# Patient Record
Sex: MALE
[De-identification: names, ages, dates, MRNs, and addresses within clinical notes are randomized per-mention and may not be internally consistent; named-entity substitution may affect disease eponyms.]

---

## 2019-01-22 ENCOUNTER — HOSPITAL ENCOUNTER (EMERGENCY)
Dept: HOSPITAL 90 - EDH | Age: 64
Discharge: HOME | End: 2019-01-22
Payer: MEDICAID

## 2019-01-22 DIAGNOSIS — E11.9: ICD-10-CM

## 2019-01-22 DIAGNOSIS — Z98.890: ICD-10-CM

## 2019-01-22 DIAGNOSIS — I10: ICD-10-CM

## 2019-01-22 DIAGNOSIS — J11.1: Primary | ICD-10-CM

## 2019-01-22 LAB
ALBUMIN SERPL-MCNC: 3.1 G/DL (ref 3.5–5)
ALT SERPL-CCNC: 16 U/L (ref 12–78)
APTT PPP: 31.4 SEC (ref 26.3–35.5)
AST SERPL-CCNC: 15 U/L (ref 10–37)
BASOPHILS NFR BLD AUTO: 0.5 % (ref 0–5)
BILIRUB SERPL-MCNC: 0.9 MG/DL (ref 0.2–1)
BUN SERPL-MCNC: 21 MG/DL (ref 7–18)
CHLORIDE SERPL-SCNC: 99 MMOL/L (ref 101–111)
CK SERPL-CCNC: 83 U/L (ref 21–232)
CO2 SERPL-SCNC: 27 MMOL/L (ref 21–32)
CREAT SERPL-MCNC: 1.4 MG/DL (ref 0.5–1.5)
EOSINOPHIL NFR BLD AUTO: 0.4 % (ref 0–8)
ERYTHROCYTE [DISTWIDTH] IN BLOOD BY AUTOMATED COUNT: 13.6 % (ref 11–15.5)
GFR SERPL CREATININE-BSD FRML MDRD: 54 ML/MIN (ref 60–?)
GLUCOSE SERPL-MCNC: 287 MG/DL (ref 70–105)
GLUCOSE UR STRIP-MCNC: 500 MG/DL
HCT VFR BLD AUTO: 41.9 % (ref 42–54)
HYALINE CASTS URNS QL MICRO: (no result) /LPF
INR PPP: 1.01 (ref 0.85–1.15)
KETONES UR STRIP-MCNC: 15 MG/DL
LYMPHOCYTES NFR SPEC AUTO: 11.5 % (ref 21–51)
MCH RBC QN AUTO: 26.7 PG (ref 27–33)
MCHC RBC AUTO-ENTMCNC: 32 G/DL (ref 32–36)
MCV RBC AUTO: 83.6 FL (ref 79–99)
MONOCYTES NFR BLD AUTO: 10.6 % (ref 3–13)
MYOGLOBIN SERPL-MCNC: 78 NG/ML (ref 10–92)
NEUTROPHILS NFR BLD AUTO: 77 % (ref 40–77)
NRBC BLD MANUAL-RTO: 0 % (ref 0–0.19)
PH UR STRIP: 5.5 [PH] (ref 5–8)
PLATELET # BLD AUTO: 207 K/UL (ref 130–400)
POTASSIUM SERPL-SCNC: 3.3 MMOL/L (ref 3.5–5.1)
PROT SERPL-MCNC: 7.7 G/DL (ref 6–8.3)
PROT UR QL STRIP: 300
PROTHROMBIN TIME: 10.6 SEC (ref 9.6–11.6)
RBC # BLD AUTO: 5.01 MIL/UL (ref 4.5–6.2)
SODIUM SERPL-SCNC: 136 MMOL/L (ref 136–145)
SP GR UR STRIP: 1.03 (ref 1–1.03)
TROPONIN I SERPL-MCNC: < 0.04 NG/ML (ref 0–0.06)
UROBILINOGEN UR STRIP-MCNC: 2 MG/DL (ref 0.2–1)
WBC # BLD AUTO: 12.6 K/UL (ref 4.8–10.8)
WBC #/AREA URNS HPF: (no result) /HPF (ref 0–1)

## 2019-01-22 PROCEDURE — 85610 PROTHROMBIN TIME: CPT

## 2019-01-22 PROCEDURE — 99284 EMERGENCY DEPT VISIT MOD MDM: CPT

## 2019-01-22 PROCEDURE — 87040 BLOOD CULTURE FOR BACTERIA: CPT

## 2019-01-22 PROCEDURE — 85025 COMPLETE CBC W/AUTO DIFF WBC: CPT

## 2019-01-22 PROCEDURE — 85730 THROMBOPLASTIN TIME PARTIAL: CPT

## 2019-01-22 PROCEDURE — 87804 INFLUENZA ASSAY W/OPTIC: CPT

## 2019-01-22 PROCEDURE — 82550 ASSAY OF CK (CPK): CPT

## 2019-01-22 PROCEDURE — 84484 ASSAY OF TROPONIN QUANT: CPT

## 2019-01-22 PROCEDURE — 80053 COMPREHEN METABOLIC PANEL: CPT

## 2019-01-22 PROCEDURE — 87088 URINE BACTERIA CULTURE: CPT

## 2019-01-22 PROCEDURE — 36415 COLL VENOUS BLD VENIPUNCTURE: CPT

## 2019-01-22 PROCEDURE — 83874 ASSAY OF MYOGLOBIN: CPT

## 2019-01-22 PROCEDURE — 81001 URINALYSIS AUTO W/SCOPE: CPT

## 2019-01-22 PROCEDURE — 83605 ASSAY OF LACTIC ACID: CPT

## 2019-01-22 PROCEDURE — 93005 ELECTROCARDIOGRAM TRACING: CPT

## 2019-01-22 PROCEDURE — 71045 X-RAY EXAM CHEST 1 VIEW: CPT

## 2020-07-27 ENCOUNTER — HOSPITAL ENCOUNTER (INPATIENT)
Dept: HOSPITAL 90 - EDH | Age: 65
LOS: 4 days | Discharge: HOME | DRG: 177 | End: 2020-07-31
Attending: FAMILY MEDICINE | Admitting: FAMILY MEDICINE
Payer: MEDICARE

## 2020-07-27 VITALS — BODY MASS INDEX: 23.31 KG/M2 | HEIGHT: 72 IN | WEIGHT: 172.1 LBS

## 2020-07-27 DIAGNOSIS — Z88.5: ICD-10-CM

## 2020-07-27 DIAGNOSIS — E11.9: ICD-10-CM

## 2020-07-27 DIAGNOSIS — Z79.4: ICD-10-CM

## 2020-07-27 DIAGNOSIS — Z96.652: ICD-10-CM

## 2020-07-27 DIAGNOSIS — I10: ICD-10-CM

## 2020-07-27 DIAGNOSIS — U07.1: Primary | ICD-10-CM

## 2020-07-27 DIAGNOSIS — J96.01: ICD-10-CM

## 2020-07-27 LAB
ALBUMIN SERPL-MCNC: 2.7 G/DL (ref 3.5–5)
ALT SERPL-CCNC: 25 U/L (ref 12–78)
APTT PPP: 34 SEC (ref 26.3–35.5)
AST SERPL-CCNC: 43 U/L (ref 10–37)
BASOPHILS NFR BLD AUTO: 0 % (ref 0–5)
BILIRUB SERPL-MCNC: 0.5 MG/DL (ref 0.2–1)
BUN SERPL-MCNC: 17 MG/DL (ref 7–18)
CHLORIDE SERPL-SCNC: 99 MMOL/L (ref 101–111)
CK SERPL-CCNC: 102 U/L (ref 21–232)
CO2 SERPL-SCNC: 23 MMOL/L (ref 21–32)
CREAT SERPL-MCNC: 0.9 MG/DL (ref 0.5–1.5)
CRP SERPL HS-MCNC: 182.3 MG/L (ref 0–9)
EOSINOPHIL NFR BLD AUTO: 0.8 % (ref 0–8)
ERYTHROCYTE [DISTWIDTH] IN BLOOD BY AUTOMATED COUNT: 13.1 % (ref 11–15.5)
GFR SERPL CREATININE-BSD FRML MDRD: 90 ML/MIN (ref 60–?)
GLUCOSE SERPL-MCNC: 112 MG/DL (ref 70–105)
GLUCOSE UR STRIP-MCNC: >=1000 MG/DL
HCT VFR BLD AUTO: 44.1 % (ref 42–54)
INR PPP: 1 (ref 0.85–1.15)
KETONES UR STRIP-MCNC: 40 MG/DL
LDH SERPL-CCNC: 363 U/L (ref 81–234)
LYMPHOCYTES NFR SPEC AUTO: 11.7 % (ref 21–51)
MCH RBC QN AUTO: 25.8 PG (ref 27–33)
MCHC RBC AUTO-ENTMCNC: 32.4 G/DL (ref 32–36)
MCV RBC AUTO: 79.6 FL (ref 79–99)
MONOCYTES NFR BLD AUTO: 6.4 % (ref 3–13)
NEUTROPHILS NFR BLD AUTO: 80.3 % (ref 40–77)
NRBC BLD MANUAL-RTO: 0 % (ref 0–0.19)
PH UR STRIP: 6 [PH] (ref 5–8)
PLATELET # BLD AUTO: 201 K/UL (ref 130–400)
POTASSIUM SERPL-SCNC: 3.6 MMOL/L (ref 3.5–5.1)
PROT SERPL-MCNC: 7.4 G/DL (ref 6–8.3)
PROTHROMBIN TIME: 10.8 SEC (ref 9.6–11.6)
RBC # BLD AUTO: 5.54 MIL/UL (ref 4.5–6.2)
RBC #/AREA URNS HPF: (no result) /HPF (ref 0–1)
SODIUM SERPL-SCNC: 135 MMOL/L (ref 136–145)
SP GR UR STRIP: 1.03 (ref 1–1.03)
UROBILINOGEN UR STRIP-MCNC: 1 MG/DL (ref 0.2–1)
WBC # BLD AUTO: 5.9 K/UL (ref 4.8–10.8)
WBC #/AREA URNS HPF: (no result) /HPF (ref 0–1)

## 2020-07-27 PROCEDURE — 80053 COMPREHEN METABOLIC PANEL: CPT

## 2020-07-27 PROCEDURE — 82728 ASSAY OF FERRITIN: CPT

## 2020-07-27 PROCEDURE — 71250 CT THORAX DX C-: CPT

## 2020-07-27 PROCEDURE — 93005 ELECTROCARDIOGRAM TRACING: CPT

## 2020-07-27 PROCEDURE — 84484 ASSAY OF TROPONIN QUANT: CPT

## 2020-07-27 PROCEDURE — 87426 SARSCOV CORONAVIRUS AG IA: CPT

## 2020-07-27 PROCEDURE — 87798 DETECT AGENT NOS DNA AMP: CPT

## 2020-07-27 PROCEDURE — 94760 N-INVAS EAR/PLS OXIMETRY 1: CPT

## 2020-07-27 PROCEDURE — 87633 RESP VIRUS 12-25 TARGETS: CPT

## 2020-07-27 PROCEDURE — 84145 PROCALCITONIN (PCT): CPT

## 2020-07-27 PROCEDURE — 85610 PROTHROMBIN TIME: CPT

## 2020-07-27 PROCEDURE — 85378 FIBRIN DEGRADE SEMIQUANT: CPT

## 2020-07-27 PROCEDURE — 83615 LACTATE (LD) (LDH) ENZYME: CPT

## 2020-07-27 PROCEDURE — 86140 C-REACTIVE PROTEIN: CPT

## 2020-07-27 PROCEDURE — 87581 M.PNEUMON DNA AMP PROBE: CPT

## 2020-07-27 PROCEDURE — 81001 URINALYSIS AUTO W/SCOPE: CPT

## 2020-07-27 PROCEDURE — 82948 REAGENT STRIP/BLOOD GLUCOSE: CPT

## 2020-07-27 PROCEDURE — 82550 ASSAY OF CK (CPK): CPT

## 2020-07-27 PROCEDURE — 87486 CHLMYD PNEUM DNA AMP PROBE: CPT

## 2020-07-27 PROCEDURE — 85025 COMPLETE CBC W/AUTO DIFF WBC: CPT

## 2020-07-27 PROCEDURE — 71045 X-RAY EXAM CHEST 1 VIEW: CPT

## 2020-07-27 PROCEDURE — 36415 COLL VENOUS BLD VENIPUNCTURE: CPT

## 2020-07-27 PROCEDURE — 85730 THROMBOPLASTIN TIME PARTIAL: CPT

## 2020-07-27 PROCEDURE — 83880 ASSAY OF NATRIURETIC PEPTIDE: CPT

## 2020-07-27 RX ADMIN — Medication SCH MG: at 21:00

## 2020-07-27 RX ADMIN — INSULIN GLARGINE SCH UNITS: 100 INJECTION, SOLUTION SUBCUTANEOUS at 21:00

## 2020-07-27 RX ADMIN — FAMOTIDINE SCH MG: 20 TABLET, FILM COATED ORAL at 21:00

## 2020-07-27 RX ADMIN — SODIUM CHLORIDE SCH UNIT: 9 INJECTION, SOLUTION INTRAVENOUS at 21:00

## 2020-07-27 RX ADMIN — DEXAMETHASONE SODIUM PHOSPHATE SCH MG: 4 INJECTION, SOLUTION INTRAMUSCULAR; INTRAVENOUS at 14:30

## 2020-07-28 VITALS — SYSTOLIC BLOOD PRESSURE: 140 MMHG | DIASTOLIC BLOOD PRESSURE: 70 MMHG

## 2020-07-28 VITALS — SYSTOLIC BLOOD PRESSURE: 127 MMHG | DIASTOLIC BLOOD PRESSURE: 68 MMHG

## 2020-07-28 VITALS — SYSTOLIC BLOOD PRESSURE: 127 MMHG | DIASTOLIC BLOOD PRESSURE: 65 MMHG

## 2020-07-28 VITALS — SYSTOLIC BLOOD PRESSURE: 140 MMHG | DIASTOLIC BLOOD PRESSURE: 73 MMHG

## 2020-07-28 LAB
ALBUMIN SERPL-MCNC: 2.4 G/DL (ref 3.5–5)
ALT SERPL-CCNC: 25 U/L (ref 12–78)
AST SERPL-CCNC: 34 U/L (ref 10–37)
BASOPHILS NFR BLD AUTO: 0.2 % (ref 0–5)
BILIRUB SERPL-MCNC: 0.4 MG/DL (ref 0.2–1)
BUN SERPL-MCNC: 22 MG/DL (ref 7–18)
CHLORIDE SERPL-SCNC: 102 MMOL/L (ref 101–111)
CO2 SERPL-SCNC: 24 MMOL/L (ref 21–32)
CREAT SERPL-MCNC: 0.9 MG/DL (ref 0.5–1.5)
CRP SERPL HS-MCNC: 175 MG/L (ref 0–9)
EOSINOPHIL NFR BLD AUTO: 0 % (ref 0–8)
ERYTHROCYTE [DISTWIDTH] IN BLOOD BY AUTOMATED COUNT: 13.2 % (ref 11–15.5)
GFR SERPL CREATININE-BSD FRML MDRD: 90 ML/MIN (ref 60–?)
GLUCOSE SERPL-MCNC: 144 MG/DL (ref 70–105)
HCT VFR BLD AUTO: 42.6 % (ref 42–54)
LDH SERPL-CCNC: 319 U/L (ref 81–234)
LYMPHOCYTES NFR SPEC AUTO: 9.7 % (ref 21–51)
MCH RBC QN AUTO: 26.2 PG (ref 27–33)
MCHC RBC AUTO-ENTMCNC: 32.4 G/DL (ref 32–36)
MCV RBC AUTO: 80.8 FL (ref 79–99)
MONOCYTES NFR BLD AUTO: 4.7 % (ref 3–13)
NEUTROPHILS NFR BLD AUTO: 83.9 % (ref 40–77)
NRBC BLD MANUAL-RTO: 0 % (ref 0–0.19)
PLAT MORPH BLD: (no result)
PLATELET # BLD AUTO: 206 K/UL (ref 130–400)
POTASSIUM SERPL-SCNC: 4.4 MMOL/L (ref 3.5–5.1)
PROT SERPL-MCNC: 7.2 G/DL (ref 6–8.3)
RBC # BLD AUTO: 5.27 MIL/UL (ref 4.5–6.2)
SODIUM SERPL-SCNC: 136 MMOL/L (ref 136–145)
WBC # BLD AUTO: 4 K/UL (ref 4.8–10.8)

## 2020-07-28 RX ADMIN — SODIUM CHLORIDE SCH UNIT: 9 INJECTION, SOLUTION INTRAVENOUS at 16:30

## 2020-07-28 RX ADMIN — SODIUM CHLORIDE SCH UNIT: 9 INJECTION, SOLUTION INTRAVENOUS at 17:04

## 2020-07-28 RX ADMIN — Medication SCH MG: at 22:13

## 2020-07-28 RX ADMIN — Medication SCH MG: at 09:00

## 2020-07-28 RX ADMIN — SODIUM CHLORIDE SCH UNIT: 9 INJECTION, SOLUTION INTRAVENOUS at 11:30

## 2020-07-28 RX ADMIN — BENZONATATE SCH MG: 100 CAPSULE ORAL at 09:00

## 2020-07-28 RX ADMIN — SODIUM CHLORIDE SCH UNIT: 9 INJECTION, SOLUTION INTRAVENOUS at 22:11

## 2020-07-28 RX ADMIN — SODIUM CHLORIDE SCH GM: 9 INJECTION, SOLUTION INTRAVENOUS at 02:15

## 2020-07-28 RX ADMIN — SODIUM CHLORIDE SCH GM: 9 INJECTION, SOLUTION INTRAVENOUS at 13:31

## 2020-07-28 RX ADMIN — INSULIN GLARGINE SCH UNITS: 100 INJECTION, SOLUTION SUBCUTANEOUS at 22:10

## 2020-07-28 RX ADMIN — Medication SCH MG: at 12:00

## 2020-07-28 RX ADMIN — OXYCODONE HYDROCHLORIDE AND ACETAMINOPHEN SCH MG: 500 TABLET ORAL at 09:00

## 2020-07-28 RX ADMIN — BENZONATATE SCH MG: 100 CAPSULE ORAL at 22:13

## 2020-07-28 RX ADMIN — DEXAMETHASONE SODIUM PHOSPHATE SCH MG: 4 INJECTION, SOLUTION INTRAMUSCULAR; INTRAVENOUS at 09:00

## 2020-07-28 RX ADMIN — BENZONATATE SCH MG: 100 CAPSULE ORAL at 13:31

## 2020-07-28 RX ADMIN — ENOXAPARIN SODIUM SCH MG: 100 INJECTION SUBCUTANEOUS at 09:00

## 2020-07-28 RX ADMIN — SODIUM CHLORIDE SCH GM: 9 INJECTION, SOLUTION INTRAVENOUS at 14:15

## 2020-07-28 RX ADMIN — BENZONATATE SCH MG: 100 CAPSULE ORAL at 14:00

## 2020-07-28 RX ADMIN — SODIUM CHLORIDE SCH GM: 9 INJECTION, SOLUTION INTRAVENOUS at 22:13

## 2020-07-28 RX ADMIN — ENOXAPARIN SODIUM SCH MG: 100 INJECTION SUBCUTANEOUS at 22:14

## 2020-07-28 RX ADMIN — SODIUM CHLORIDE SCH UNIT: 9 INJECTION, SOLUTION INTRAVENOUS at 07:30

## 2020-07-28 RX ADMIN — FAMOTIDINE SCH MG: 20 TABLET, FILM COATED ORAL at 22:14

## 2020-07-28 RX ADMIN — FAMOTIDINE SCH MG: 20 TABLET, FILM COATED ORAL at 09:00

## 2020-07-28 NOTE — NUR
CHART CHECK COMPLETED. 



Pt IS A 64 Y.O. MALE ADMITTED SECONDARY TO COVID 19, HTN, OM, ACUTE HYPOXEMIC RESPIRATORY 
FAILURE. Pt HAS A PAST MEDICAL HISTORY SIGNIFICANT FOR HTN, DM, APPENDECTOMY 50 YEARS AGO,  
L KNEE REPLACEMENT 10 YEARS AGO. Pt CURRENTLY ON REGULAR TEXTURE,THIN LIQUID DIET 
(CONSISTENT CARB). PLEASE REQUEST FORMAL SKILLED SPEECH/SWALLOW EVALUATION IF Pt PRESENTS 
WITH +S/S OF ASPIRATION SUCH AS COUGH RESPONSE, THROAT CLEAR, OR WET VOCAL QUALITY DURING 
P.O. 

-------------------------------------------------------------------------------

Addendum: 07/28/20 at 1307 by FREIDA AYALA, SPT ST

-------------------------------------------------------------------------------

Amended: Links added.

## 2020-07-29 VITALS — DIASTOLIC BLOOD PRESSURE: 71 MMHG | SYSTOLIC BLOOD PRESSURE: 130 MMHG

## 2020-07-29 VITALS — DIASTOLIC BLOOD PRESSURE: 75 MMHG | SYSTOLIC BLOOD PRESSURE: 137 MMHG

## 2020-07-29 VITALS — DIASTOLIC BLOOD PRESSURE: 55 MMHG | SYSTOLIC BLOOD PRESSURE: 108 MMHG

## 2020-07-29 VITALS — DIASTOLIC BLOOD PRESSURE: 72 MMHG | SYSTOLIC BLOOD PRESSURE: 130 MMHG

## 2020-07-29 VITALS — SYSTOLIC BLOOD PRESSURE: 133 MMHG | DIASTOLIC BLOOD PRESSURE: 74 MMHG

## 2020-07-29 VITALS — DIASTOLIC BLOOD PRESSURE: 62 MMHG | SYSTOLIC BLOOD PRESSURE: 114 MMHG

## 2020-07-29 LAB
ALBUMIN SERPL-MCNC: 2.2 G/DL (ref 3.5–5)
ALT SERPL-CCNC: 18 U/L (ref 12–78)
AST SERPL-CCNC: 24 U/L (ref 10–37)
BASOPHILS NFR BLD AUTO: 0.1 % (ref 0–5)
BILIRUB SERPL-MCNC: 0.4 MG/DL (ref 0.2–1)
BUN SERPL-MCNC: 29 MG/DL (ref 7–18)
CHLORIDE SERPL-SCNC: 105 MMOL/L (ref 101–111)
CO2 SERPL-SCNC: 24 MMOL/L (ref 21–32)
CREAT SERPL-MCNC: 0.9 MG/DL (ref 0.5–1.5)
CRP SERPL HS-MCNC: 72.9 MG/L (ref 0–9)
EOSINOPHIL NFR BLD AUTO: 0.1 % (ref 0–8)
ERYTHROCYTE [DISTWIDTH] IN BLOOD BY AUTOMATED COUNT: 13 % (ref 11–15.5)
GFR SERPL CREATININE-BSD FRML MDRD: 90 ML/MIN (ref 60–?)
GLUCOSE SERPL-MCNC: 130 MG/DL (ref 70–105)
HCT VFR BLD AUTO: 41.5 % (ref 42–54)
LDH SERPL-CCNC: 269 U/L (ref 81–234)
LYMPHOCYTES NFR SPEC AUTO: 11.3 % (ref 21–51)
MCH RBC QN AUTO: 25.6 PG (ref 27–33)
MCHC RBC AUTO-ENTMCNC: 32.3 G/DL (ref 32–36)
MCV RBC AUTO: 79.3 FL (ref 79–99)
MONOCYTES NFR BLD AUTO: 7.8 % (ref 3–13)
NEUTROPHILS NFR BLD AUTO: 78.7 % (ref 40–77)
NRBC BLD MANUAL-RTO: 0 % (ref 0–0.19)
PLATELET # BLD AUTO: 278 K/UL (ref 130–400)
POTASSIUM SERPL-SCNC: 3.9 MMOL/L (ref 3.5–5.1)
PROT SERPL-MCNC: 6.3 G/DL (ref 6–8.3)
RBC # BLD AUTO: 5.23 MIL/UL (ref 4.5–6.2)
SODIUM SERPL-SCNC: 139 MMOL/L (ref 136–145)
WBC # BLD AUTO: 7.4 K/UL (ref 4.8–10.8)

## 2020-07-29 RX ADMIN — INSULIN GLARGINE SCH UNITS: 100 INJECTION, SOLUTION SUBCUTANEOUS at 21:36

## 2020-07-29 RX ADMIN — SODIUM CHLORIDE SCH UNIT: 9 INJECTION, SOLUTION INTRAVENOUS at 21:37

## 2020-07-29 RX ADMIN — Medication SCH MG: at 11:29

## 2020-07-29 RX ADMIN — FAMOTIDINE SCH MG: 20 TABLET, FILM COATED ORAL at 09:21

## 2020-07-29 RX ADMIN — Medication SCH MG: at 21:15

## 2020-07-29 RX ADMIN — SODIUM CHLORIDE SCH GM: 9 INJECTION, SOLUTION INTRAVENOUS at 13:38

## 2020-07-29 RX ADMIN — ENOXAPARIN SODIUM SCH MG: 100 INJECTION SUBCUTANEOUS at 09:21

## 2020-07-29 RX ADMIN — SODIUM CHLORIDE SCH UNIT: 9 INJECTION, SOLUTION INTRAVENOUS at 11:24

## 2020-07-29 RX ADMIN — ENOXAPARIN SODIUM SCH MG: 100 INJECTION SUBCUTANEOUS at 21:15

## 2020-07-29 RX ADMIN — Medication SCH MG: at 09:21

## 2020-07-29 RX ADMIN — FAMOTIDINE SCH MG: 20 TABLET, FILM COATED ORAL at 21:15

## 2020-07-29 RX ADMIN — OXYCODONE HYDROCHLORIDE AND ACETAMINOPHEN SCH MG: 500 TABLET ORAL at 09:21

## 2020-07-29 RX ADMIN — BENZONATATE SCH MG: 100 CAPSULE ORAL at 13:38

## 2020-07-29 RX ADMIN — SODIUM CHLORIDE SCH UNIT: 9 INJECTION, SOLUTION INTRAVENOUS at 07:30

## 2020-07-29 RX ADMIN — DEXAMETHASONE SODIUM PHOSPHATE SCH MG: 4 INJECTION, SOLUTION INTRAMUSCULAR; INTRAVENOUS at 09:19

## 2020-07-29 RX ADMIN — BENZONATATE SCH MG: 100 CAPSULE ORAL at 21:15

## 2020-07-29 RX ADMIN — SODIUM CHLORIDE SCH UNIT: 9 INJECTION, SOLUTION INTRAVENOUS at 16:12

## 2020-07-29 RX ADMIN — BENZONATATE SCH MG: 100 CAPSULE ORAL at 09:21

## 2020-07-29 NOTE — NUR
CM NOTE/UNSUCCESSFUL IA

UNABLE TO MEET PATIENT FACE TO FACE. ROOM PHONE CALLED, NO ANSWER. CELL PHONE CALLED, NO 
ANSWER, NEXT OF KIN CALLED, LUIS DYLON, NO ANSWER. CM TO FOLLOW UP FOR IA 
INFORMATION. 

-------------------------------------------------------------------------------

Addendum: 07/29/20 at 1605 by TERRI BHAKTA RN CM

-------------------------------------------------------------------------------

Amended: Links added.

## 2020-07-30 VITALS — SYSTOLIC BLOOD PRESSURE: 129 MMHG | DIASTOLIC BLOOD PRESSURE: 66 MMHG

## 2020-07-30 VITALS — SYSTOLIC BLOOD PRESSURE: 130 MMHG | DIASTOLIC BLOOD PRESSURE: 74 MMHG

## 2020-07-30 VITALS — SYSTOLIC BLOOD PRESSURE: 127 MMHG | DIASTOLIC BLOOD PRESSURE: 78 MMHG

## 2020-07-30 VITALS — DIASTOLIC BLOOD PRESSURE: 65 MMHG | SYSTOLIC BLOOD PRESSURE: 111 MMHG

## 2020-07-30 VITALS — SYSTOLIC BLOOD PRESSURE: 135 MMHG | DIASTOLIC BLOOD PRESSURE: 74 MMHG

## 2020-07-30 LAB
ALBUMIN SERPL-MCNC: 2.5 G/DL (ref 3.5–5)
ALT SERPL-CCNC: 23 U/L (ref 12–78)
AST SERPL-CCNC: 21 U/L (ref 10–37)
BASOPHILS NFR BLD AUTO: 0.3 % (ref 0–5)
BILIRUB SERPL-MCNC: 0.3 MG/DL (ref 0.2–1)
BUN SERPL-MCNC: 26 MG/DL (ref 7–18)
CHLORIDE SERPL-SCNC: 103 MMOL/L (ref 101–111)
CO2 SERPL-SCNC: 26 MMOL/L (ref 21–32)
CREAT SERPL-MCNC: 0.9 MG/DL (ref 0.5–1.5)
CRP SERPL HS-MCNC: 38.3 MG/L (ref 0–9)
EOSINOPHIL NFR BLD AUTO: 0.1 % (ref 0–8)
ERYTHROCYTE [DISTWIDTH] IN BLOOD BY AUTOMATED COUNT: 13 % (ref 11–15.5)
GFR SERPL CREATININE-BSD FRML MDRD: 90 ML/MIN (ref 60–?)
GLUCOSE SERPL-MCNC: 129 MG/DL (ref 70–105)
HCT VFR BLD AUTO: 44.2 % (ref 42–54)
LDH SERPL-CCNC: 353 U/L (ref 81–234)
LYMPHOCYTES NFR SPEC AUTO: 15.3 % (ref 21–51)
MCH RBC QN AUTO: 26.1 PG (ref 27–33)
MCHC RBC AUTO-ENTMCNC: 32.6 G/DL (ref 32–36)
MCV RBC AUTO: 80.1 FL (ref 79–99)
MONOCYTES NFR BLD AUTO: 8.3 % (ref 3–13)
NEUTROPHILS NFR BLD AUTO: 73.9 % (ref 40–77)
NRBC BLD MANUAL-RTO: 0 % (ref 0–0.19)
PLATELET # BLD AUTO: 351 K/UL (ref 130–400)
POTASSIUM SERPL-SCNC: 4 MMOL/L (ref 3.5–5.1)
PROT SERPL-MCNC: 7 G/DL (ref 6–8.3)
RBC # BLD AUTO: 5.52 MIL/UL (ref 4.5–6.2)
SODIUM SERPL-SCNC: 138 MMOL/L (ref 136–145)
WBC # BLD AUTO: 7.1 K/UL (ref 4.8–10.8)

## 2020-07-30 RX ADMIN — DEXAMETHASONE SODIUM PHOSPHATE SCH MG: 4 INJECTION, SOLUTION INTRAMUSCULAR; INTRAVENOUS at 09:23

## 2020-07-30 RX ADMIN — Medication SCH MG: at 12:22

## 2020-07-30 RX ADMIN — SODIUM CHLORIDE SCH UNIT: 9 INJECTION, SOLUTION INTRAVENOUS at 21:32

## 2020-07-30 RX ADMIN — BENZONATATE SCH MG: 100 CAPSULE ORAL at 14:08

## 2020-07-30 RX ADMIN — FAMOTIDINE SCH MG: 20 TABLET, FILM COATED ORAL at 09:22

## 2020-07-30 RX ADMIN — OXYCODONE HYDROCHLORIDE AND ACETAMINOPHEN SCH MG: 500 TABLET ORAL at 09:22

## 2020-07-30 RX ADMIN — Medication SCH MG: at 21:19

## 2020-07-30 RX ADMIN — SODIUM CHLORIDE SCH UNIT: 9 INJECTION, SOLUTION INTRAVENOUS at 05:32

## 2020-07-30 RX ADMIN — BENZONATATE SCH MG: 100 CAPSULE ORAL at 09:22

## 2020-07-30 RX ADMIN — ENOXAPARIN SODIUM SCH MG: 100 INJECTION SUBCUTANEOUS at 09:23

## 2020-07-30 RX ADMIN — ENOXAPARIN SODIUM SCH MG: 100 INJECTION SUBCUTANEOUS at 21:20

## 2020-07-30 RX ADMIN — Medication SCH MG: at 09:22

## 2020-07-30 RX ADMIN — SODIUM CHLORIDE SCH UNIT: 9 INJECTION, SOLUTION INTRAVENOUS at 17:27

## 2020-07-30 RX ADMIN — Medication SCH MG: at 09:23

## 2020-07-30 RX ADMIN — SODIUM CHLORIDE SCH UNIT: 9 INJECTION, SOLUTION INTRAVENOUS at 12:24

## 2020-07-30 RX ADMIN — SODIUM CHLORIDE SCH GM: 9 INJECTION, SOLUTION INTRAVENOUS at 14:08

## 2020-07-30 RX ADMIN — SODIUM CHLORIDE SCH GM: 9 INJECTION, SOLUTION INTRAVENOUS at 02:10

## 2020-07-30 RX ADMIN — BENZONATATE SCH MG: 100 CAPSULE ORAL at 21:18

## 2020-07-30 RX ADMIN — FAMOTIDINE SCH MG: 20 TABLET, FILM COATED ORAL at 21:18

## 2020-07-30 RX ADMIN — INSULIN GLARGINE SCH UNITS: 100 INJECTION, SOLUTION SUBCUTANEOUS at 21:24

## 2020-07-31 VITALS — SYSTOLIC BLOOD PRESSURE: 145 MMHG | DIASTOLIC BLOOD PRESSURE: 76 MMHG

## 2020-07-31 VITALS — SYSTOLIC BLOOD PRESSURE: 113 MMHG | DIASTOLIC BLOOD PRESSURE: 61 MMHG

## 2020-07-31 VITALS — SYSTOLIC BLOOD PRESSURE: 120 MMHG | DIASTOLIC BLOOD PRESSURE: 58 MMHG

## 2020-07-31 VITALS — DIASTOLIC BLOOD PRESSURE: 67 MMHG | SYSTOLIC BLOOD PRESSURE: 128 MMHG

## 2020-07-31 VITALS — DIASTOLIC BLOOD PRESSURE: 66 MMHG | SYSTOLIC BLOOD PRESSURE: 127 MMHG

## 2020-07-31 RX ADMIN — BENZONATATE SCH MG: 100 CAPSULE ORAL at 16:31

## 2020-07-31 RX ADMIN — Medication SCH MG: at 16:31

## 2020-07-31 RX ADMIN — SODIUM CHLORIDE SCH UNIT: 9 INJECTION, SOLUTION INTRAVENOUS at 06:07

## 2020-07-31 RX ADMIN — Medication SCH MG: at 13:12

## 2020-07-31 RX ADMIN — FAMOTIDINE SCH MG: 20 TABLET, FILM COATED ORAL at 08:30

## 2020-07-31 RX ADMIN — Medication SCH MG: at 08:31

## 2020-07-31 RX ADMIN — BENZONATATE SCH MG: 100 CAPSULE ORAL at 08:30

## 2020-07-31 RX ADMIN — SODIUM CHLORIDE SCH UNIT: 9 INJECTION, SOLUTION INTRAVENOUS at 11:30

## 2020-07-31 RX ADMIN — SODIUM CHLORIDE SCH GM: 9 INJECTION, SOLUTION INTRAVENOUS at 03:33

## 2020-07-31 RX ADMIN — ENOXAPARIN SODIUM SCH MG: 100 INJECTION SUBCUTANEOUS at 08:32

## 2020-07-31 RX ADMIN — Medication SCH MG: at 08:30

## 2020-07-31 RX ADMIN — OXYCODONE HYDROCHLORIDE AND ACETAMINOPHEN SCH MG: 500 TABLET ORAL at 08:31

## 2020-07-31 RX ADMIN — DEXAMETHASONE SODIUM PHOSPHATE SCH MG: 4 INJECTION, SOLUTION INTRAMUSCULAR; INTRAVENOUS at 08:31

## 2020-07-31 RX ADMIN — SODIUM CHLORIDE SCH GM: 9 INJECTION, SOLUTION INTRAVENOUS at 16:30

## 2020-07-31 NOTE — NUR
NOTE

PATIENT REPORTS THAT HIS RIDE HAS CALLED HIM AND IS HERE. CNA TOOK PATIENT TO ENTRANCE VIA 
WHEELCHAIR.

## 2020-10-07 ENCOUNTER — HOSPITAL ENCOUNTER (OUTPATIENT)
Dept: HOSPITAL 90 - WHH | Age: 65
Discharge: HOME | End: 2020-10-07
Attending: PODIATRIST
Payer: MEDICARE

## 2020-10-07 DIAGNOSIS — M48.00: ICD-10-CM

## 2020-10-07 DIAGNOSIS — L97.512: ICD-10-CM

## 2020-10-07 DIAGNOSIS — E11.42: ICD-10-CM

## 2020-10-07 DIAGNOSIS — Z79.4: ICD-10-CM

## 2020-10-07 DIAGNOSIS — I25.2: ICD-10-CM

## 2020-10-07 DIAGNOSIS — L84: ICD-10-CM

## 2020-10-07 DIAGNOSIS — Z90.49: ICD-10-CM

## 2020-10-07 DIAGNOSIS — E11.621: Primary | ICD-10-CM

## 2020-10-07 PROCEDURE — 87186 SC STD MICRODIL/AGAR DIL: CPT

## 2020-10-07 PROCEDURE — 87077 CULTURE AEROBIC IDENTIFY: CPT

## 2020-10-07 PROCEDURE — 87070 CULTURE OTHR SPECIMN AEROBIC: CPT

## 2020-10-07 PROCEDURE — 73630 X-RAY EXAM OF FOOT: CPT

## 2020-10-07 PROCEDURE — 97597 DBRDMT OPN WND 1ST 20 CM/<: CPT

## 2020-10-14 ENCOUNTER — HOSPITAL ENCOUNTER (OUTPATIENT)
Dept: HOSPITAL 90 - WHH | Age: 65
Discharge: HOME | End: 2020-10-14
Attending: PODIATRIST
Payer: MEDICARE

## 2020-10-14 DIAGNOSIS — E11.621: Primary | ICD-10-CM

## 2020-10-14 DIAGNOSIS — E78.5: ICD-10-CM

## 2020-10-14 DIAGNOSIS — I25.2: ICD-10-CM

## 2020-10-14 DIAGNOSIS — L84: ICD-10-CM

## 2020-10-14 DIAGNOSIS — L97.512: ICD-10-CM

## 2020-10-14 DIAGNOSIS — I10: ICD-10-CM

## 2020-10-14 DIAGNOSIS — M48.00: ICD-10-CM

## 2020-10-14 DIAGNOSIS — Z90.49: ICD-10-CM

## 2020-10-14 DIAGNOSIS — E11.42: ICD-10-CM

## 2020-10-14 DIAGNOSIS — Z79.4: ICD-10-CM

## 2020-10-14 PROCEDURE — 11042 DBRDMT SUBQ TIS 1ST 20SQCM/<: CPT

## 2020-10-28 ENCOUNTER — HOSPITAL ENCOUNTER (OUTPATIENT)
Dept: HOSPITAL 90 - WHH | Age: 65
Discharge: HOME | End: 2020-10-28
Attending: PODIATRIST
Payer: MEDICARE

## 2020-10-28 DIAGNOSIS — E11.621: Primary | ICD-10-CM

## 2020-10-28 DIAGNOSIS — L97.512: ICD-10-CM

## 2020-10-28 DIAGNOSIS — E78.5: ICD-10-CM

## 2020-10-28 DIAGNOSIS — Z79.4: ICD-10-CM

## 2020-10-28 DIAGNOSIS — M48.00: ICD-10-CM

## 2020-10-28 DIAGNOSIS — I25.2: ICD-10-CM

## 2020-10-28 DIAGNOSIS — Z90.49: ICD-10-CM

## 2020-10-28 DIAGNOSIS — L84: ICD-10-CM

## 2020-10-28 DIAGNOSIS — E11.42: ICD-10-CM

## 2020-10-28 DIAGNOSIS — I10: ICD-10-CM

## 2020-10-28 PROCEDURE — 11042 DBRDMT SUBQ TIS 1ST 20SQCM/<: CPT

## 2020-11-11 ENCOUNTER — HOSPITAL ENCOUNTER (OUTPATIENT)
Dept: HOSPITAL 90 - WHH | Age: 65
Discharge: HOME | End: 2020-11-11
Attending: PODIATRIST
Payer: MEDICARE

## 2020-11-11 DIAGNOSIS — M48.00: ICD-10-CM

## 2020-11-11 DIAGNOSIS — Z79.4: ICD-10-CM

## 2020-11-11 DIAGNOSIS — I10: ICD-10-CM

## 2020-11-11 DIAGNOSIS — L84: ICD-10-CM

## 2020-11-11 DIAGNOSIS — E11.621: Primary | ICD-10-CM

## 2020-11-11 DIAGNOSIS — E78.5: ICD-10-CM

## 2020-11-11 DIAGNOSIS — E11.42: ICD-10-CM

## 2020-11-11 DIAGNOSIS — L97.512: ICD-10-CM

## 2020-11-11 DIAGNOSIS — I25.2: ICD-10-CM

## 2020-11-11 DIAGNOSIS — Z90.49: ICD-10-CM

## 2020-11-11 PROCEDURE — 11042 DBRDMT SUBQ TIS 1ST 20SQCM/<: CPT

## 2020-11-18 ENCOUNTER — HOSPITAL ENCOUNTER (OUTPATIENT)
Dept: HOSPITAL 90 - WHH | Age: 65
Discharge: HOME | End: 2020-11-18
Attending: PODIATRIST
Payer: MEDICARE

## 2020-11-18 DIAGNOSIS — E11.621: Primary | ICD-10-CM

## 2020-11-18 DIAGNOSIS — M48.00: ICD-10-CM

## 2020-11-18 DIAGNOSIS — Z79.4: ICD-10-CM

## 2020-11-18 DIAGNOSIS — I25.2: ICD-10-CM

## 2020-11-18 DIAGNOSIS — E11.42: ICD-10-CM

## 2020-11-18 DIAGNOSIS — I10: ICD-10-CM

## 2020-11-18 DIAGNOSIS — E78.5: ICD-10-CM

## 2020-11-18 DIAGNOSIS — L97.512: ICD-10-CM

## 2020-11-18 DIAGNOSIS — Z90.49: ICD-10-CM

## 2020-11-18 DIAGNOSIS — L84: ICD-10-CM

## 2020-12-02 ENCOUNTER — HOSPITAL ENCOUNTER (OUTPATIENT)
Dept: HOSPITAL 90 - WHH | Age: 65
Discharge: HOME | End: 2020-12-02
Attending: PODIATRIST
Payer: MEDICARE

## 2020-12-02 DIAGNOSIS — I25.2: ICD-10-CM

## 2020-12-02 DIAGNOSIS — L97.512: ICD-10-CM

## 2020-12-02 DIAGNOSIS — L84: ICD-10-CM

## 2020-12-02 DIAGNOSIS — E11.42: ICD-10-CM

## 2020-12-02 DIAGNOSIS — E78.5: ICD-10-CM

## 2020-12-02 DIAGNOSIS — Z90.49: ICD-10-CM

## 2020-12-02 DIAGNOSIS — I10: ICD-10-CM

## 2020-12-02 DIAGNOSIS — Z79.4: ICD-10-CM

## 2020-12-02 DIAGNOSIS — E11.621: Primary | ICD-10-CM

## 2020-12-02 DIAGNOSIS — M48.00: ICD-10-CM

## 2020-12-02 PROCEDURE — 11042 DBRDMT SUBQ TIS 1ST 20SQCM/<: CPT

## 2020-12-16 ENCOUNTER — HOSPITAL ENCOUNTER (OUTPATIENT)
Dept: HOSPITAL 90 - WHH | Age: 65
Discharge: HOME | End: 2020-12-16
Attending: PODIATRIST
Payer: MEDICARE

## 2020-12-16 DIAGNOSIS — Z79.4: ICD-10-CM

## 2020-12-16 DIAGNOSIS — E11.621: Primary | ICD-10-CM

## 2020-12-16 DIAGNOSIS — I10: ICD-10-CM

## 2020-12-16 DIAGNOSIS — Z90.49: ICD-10-CM

## 2020-12-16 DIAGNOSIS — E78.5: ICD-10-CM

## 2020-12-16 DIAGNOSIS — L84: ICD-10-CM

## 2020-12-16 DIAGNOSIS — L97.512: ICD-10-CM

## 2020-12-16 DIAGNOSIS — M48.00: ICD-10-CM

## 2020-12-16 DIAGNOSIS — E11.42: ICD-10-CM

## 2020-12-16 DIAGNOSIS — I25.2: ICD-10-CM

## 2020-12-30 ENCOUNTER — HOSPITAL ENCOUNTER (OUTPATIENT)
Dept: HOSPITAL 90 - WHH | Age: 65
Discharge: HOME | End: 2020-12-30
Attending: PODIATRIST
Payer: MEDICARE

## 2020-12-30 DIAGNOSIS — E11.42: ICD-10-CM

## 2020-12-30 DIAGNOSIS — L97.518: ICD-10-CM

## 2020-12-30 DIAGNOSIS — I10: ICD-10-CM

## 2020-12-30 DIAGNOSIS — L84: ICD-10-CM

## 2020-12-30 DIAGNOSIS — Z90.49: ICD-10-CM

## 2020-12-30 DIAGNOSIS — Z79.4: ICD-10-CM

## 2020-12-30 DIAGNOSIS — E11.621: Primary | ICD-10-CM

## 2020-12-30 DIAGNOSIS — I25.2: ICD-10-CM

## 2020-12-30 DIAGNOSIS — E78.5: ICD-10-CM

## 2020-12-30 DIAGNOSIS — M48.00: ICD-10-CM

## 2023-06-23 ENCOUNTER — HOSPITAL ENCOUNTER (OUTPATIENT)
Dept: HOSPITAL 90 - RAH | Age: 68
Discharge: HOME | End: 2023-06-23
Attending: SURGERY
Payer: COMMERCIAL

## 2023-06-23 DIAGNOSIS — M79.89: Primary | ICD-10-CM

## 2023-06-23 DIAGNOSIS — L02.512: ICD-10-CM

## 2023-06-23 PROCEDURE — 73218 MRI UPPER EXTREMITY W/O DYE: CPT

## 2023-08-10 ENCOUNTER — HOSPITAL ENCOUNTER (OUTPATIENT)
Dept: HOSPITAL 90 - RAH | Age: 68
Discharge: HOME | End: 2023-08-10
Attending: SURGERY
Payer: COMMERCIAL

## 2023-08-10 DIAGNOSIS — M19.042: Primary | ICD-10-CM

## 2023-08-10 DIAGNOSIS — M25.442: ICD-10-CM

## 2023-08-10 PROCEDURE — 73130 X-RAY EXAM OF HAND: CPT

## 2023-09-08 ENCOUNTER — HOSPITAL ENCOUNTER (OUTPATIENT)
Dept: HOSPITAL 90 - RAH | Age: 68
Discharge: HOME | End: 2023-09-08
Attending: FAMILY MEDICINE
Payer: COMMERCIAL

## 2023-09-08 DIAGNOSIS — M79.641: ICD-10-CM

## 2023-09-08 DIAGNOSIS — M19.041: Primary | ICD-10-CM

## 2023-09-08 PROCEDURE — 73130 X-RAY EXAM OF HAND: CPT
